# Patient Record
Sex: FEMALE | Race: WHITE | NOT HISPANIC OR LATINO | Employment: OTHER | URBAN - METROPOLITAN AREA
[De-identification: names, ages, dates, MRNs, and addresses within clinical notes are randomized per-mention and may not be internally consistent; named-entity substitution may affect disease eponyms.]

---

## 2017-01-03 ENCOUNTER — TELEPHONE (OUTPATIENT)
Dept: HEMATOLOGY/ONCOLOGY | Age: 67
End: 2017-01-03

## 2017-01-03 ENCOUNTER — LAB SERVICES (OUTPATIENT)
Dept: HEMATOLOGY/ONCOLOGY | Age: 67
End: 2017-01-03
Attending: INTERNAL MEDICINE

## 2017-01-03 DIAGNOSIS — C50.912 BILATERAL MALIGNANT NEOPLASM OF BREAST IN FEMALE, UNSPECIFIED SITE OF BREAST: ICD-10-CM

## 2017-01-03 DIAGNOSIS — C50.911 BILATERAL MALIGNANT NEOPLASM OF BREAST IN FEMALE, UNSPECIFIED SITE OF BREAST: ICD-10-CM

## 2017-01-03 LAB
ALBUMIN SERPL-MCNC: 3.5 G/DL (ref 3.6–5.1)
ALBUMIN/GLOB SERPL: 0.9 {RATIO} (ref 1–2.4)
ALP SERPL-CCNC: 104 UNITS/L (ref 45–117)
ALT SERPL-CCNC: 20 UNITS/L
ANION GAP SERPL CALC-SCNC: 9 MMOL/L (ref 10–20)
AST SERPL-CCNC: 15 UNITS/L
BASOPHILS # BLD AUTO: 0 K/MCL (ref 0–0.3)
BASOPHILS NFR BLD AUTO: 1 %
BILIRUB SERPL-MCNC: 0.3 MG/DL (ref 0.2–1)
BUN SERPL-MCNC: 19 MG/DL (ref 10–20)
BUN/CREAT SERPL: 23 (ref 7–25)
CALCIUM SERPL-MCNC: 9 MG/DL (ref 8.4–10.2)
CANCER AG27-29 SERPL-ACNC: 12.9 UNITS/ML (ref 0–40)
CHLORIDE SERPL-SCNC: 91 MMOL/L (ref 98–107)
CO2 SERPL-SCNC: 32 MMOL/L (ref 21–32)
CREAT SERPL-MCNC: 0.84 MG/DL (ref 0.51–0.95)
DIFFERENTIAL METHOD BLD: NORMAL
EOSINOPHIL # BLD AUTO: 0.3 K/MCL (ref 0.1–0.5)
EOSINOPHIL NFR SPEC: 5 %
ERYTHROCYTE [DISTWIDTH] IN BLOOD: 12.9 % (ref 11–15)
FASTING STATUS PATIENT QL REPORTED: ABNORMAL HRS
GLOBULIN SER-MCNC: 3.7 G/DL (ref 2–4)
GLUCOSE SERPL-MCNC: 94 MG/DL (ref 65–99)
HCT VFR BLD CALC: 38.3 % (ref 36–46.5)
HGB BLD-MCNC: 13.3 G/DL (ref 12–15.5)
LYMPHOCYTES # BLD MANUAL: 1.4 K/MCL (ref 1–4)
LYMPHOCYTES NFR BLD MANUAL: 27 %
MCH RBC QN AUTO: 30.6 PG (ref 26–34)
MCHC RBC AUTO-ENTMCNC: 34.7 G/DL (ref 32–36.5)
MCV RBC AUTO: 88.2 FL (ref 78–100)
MONOCYTES # BLD MANUAL: 0.5 K/MCL (ref 0.3–0.9)
MONOCYTES NFR BLD MANUAL: 10 %
NEUTROPHILS # BLD AUTO: 2.8 K/MCL (ref 1.8–7.7)
NEUTROPHILS NFR BLD AUTO: 57 %
PLATELET # BLD: 264 K/MCL (ref 140–450)
POTASSIUM SERPL-SCNC: 3.5 MMOL/L (ref 3.4–5.1)
PROT SERPL-MCNC: 7.2 G/DL (ref 6.4–8.2)
RBC # BLD: 4.34 MIL/MCL (ref 4–5.2)
SODIUM SERPL-SCNC: 128 MMOL/L (ref 135–145)
WBC # BLD: 5 K/MCL (ref 4.2–11)

## 2017-01-03 PROCEDURE — 85025 COMPLETE CBC W/AUTO DIFF WBC: CPT

## 2017-01-03 PROCEDURE — 80053 COMPREHEN METABOLIC PANEL: CPT

## 2017-01-03 PROCEDURE — 36415 COLL VENOUS BLD VENIPUNCTURE: CPT

## 2017-01-03 PROCEDURE — 86300 IMMUNOASSAY TUMOR CA 15-3: CPT

## 2017-01-03 RX ORDER — ANASTROZOLE 1 MG/1
1 TABLET ORAL DAILY
Status: SHIPPED | COMMUNITY
Start: 2017-01-03 | End: 2017-01-10 | Stop reason: ALTCHOICE

## 2017-01-09 ENCOUNTER — OFFICE VISIT (OUTPATIENT)
Dept: SURGERY | Age: 67
End: 2017-01-09

## 2017-01-09 VITALS
SYSTOLIC BLOOD PRESSURE: 120 MMHG | HEIGHT: 67 IN | WEIGHT: 153 LBS | BODY MASS INDEX: 24.01 KG/M2 | DIASTOLIC BLOOD PRESSURE: 68 MMHG

## 2017-01-09 DIAGNOSIS — C50.919 MALIGNANT NEOPLASM OF FEMALE BREAST, UNSPECIFIED LATERALITY, UNSPECIFIED SITE OF BREAST: Primary | ICD-10-CM

## 2017-01-09 PROCEDURE — 99213 OFFICE O/P EST LOW 20 MIN: CPT | Performed by: SURGERY

## 2017-01-10 ENCOUNTER — OFFICE VISIT (OUTPATIENT)
Dept: HEMATOLOGY/ONCOLOGY | Age: 67
End: 2017-01-10
Attending: INTERNAL MEDICINE

## 2017-01-10 DIAGNOSIS — C50.911 MALIGNANT NEOPLASM OF RIGHT FEMALE BREAST, UNSPECIFIED SITE OF BREAST: Primary | ICD-10-CM

## 2017-01-10 DIAGNOSIS — Z78.0 ASYMPTOMATIC MENOPAUSAL STATE: ICD-10-CM

## 2017-01-10 LAB — VIA MED ONC PATHWAYS TAG: NORMAL

## 2017-01-10 PROCEDURE — 99213 OFFICE O/P EST LOW 20 MIN: CPT | Performed by: INTERNAL MEDICINE

## 2017-01-10 RX ORDER — EXEMESTANE 25 MG/1
25 TABLET ORAL DAILY
Qty: 90 TABLET | Refills: 3 | Status: SHIPPED | OUTPATIENT
Start: 2017-01-10 | End: 2017-05-02 | Stop reason: ALTCHOICE

## 2017-01-10 ASSESSMENT — PAIN SCALES - GENERAL: PAINLEVEL: 0

## 2017-02-06 ENCOUNTER — OFFICE VISIT (OUTPATIENT)
Dept: SURGERY | Age: 67
End: 2017-02-06

## 2017-02-06 VITALS
SYSTOLIC BLOOD PRESSURE: 112 MMHG | DIASTOLIC BLOOD PRESSURE: 74 MMHG | WEIGHT: 154.8 LBS | HEIGHT: 67 IN | BODY MASS INDEX: 24.3 KG/M2 | HEART RATE: 68 BPM

## 2017-02-06 DIAGNOSIS — Z09 SURGICAL FOLLOW-UP CARE: Primary | ICD-10-CM

## 2017-02-06 PROCEDURE — 99024 POSTOP FOLLOW-UP VISIT: CPT | Performed by: SURGERY

## 2017-04-27 ENCOUNTER — TELEPHONE (OUTPATIENT)
Dept: HEMATOLOGY/ONCOLOGY | Age: 67
End: 2017-04-27

## 2017-04-27 DIAGNOSIS — C50.912 MALIGNANT NEOPLASM OF LEFT FEMALE BREAST, UNSPECIFIED SITE OF BREAST: ICD-10-CM

## 2017-04-27 DIAGNOSIS — C50.911 MALIGNANT NEOPLASM OF RIGHT FEMALE BREAST, UNSPECIFIED SITE OF BREAST: Primary | ICD-10-CM

## 2017-05-02 ENCOUNTER — TELEPHONE (OUTPATIENT)
Dept: HEMATOLOGY/ONCOLOGY | Age: 67
End: 2017-05-02

## 2017-05-02 DIAGNOSIS — C50.911 MALIGNANT NEOPLASM OF RIGHT FEMALE BREAST, UNSPECIFIED SITE OF BREAST: Primary | ICD-10-CM

## 2017-05-02 RX ORDER — TAMOXIFEN CITRATE 20 MG/1
20 TABLET ORAL DAILY
Qty: 30 TABLET | Refills: 0 | Status: SHIPPED | OUTPATIENT
Start: 2017-05-02 | End: 2017-11-06 | Stop reason: ALTCHOICE

## 2017-05-08 ENCOUNTER — TELEPHONE (OUTPATIENT)
Dept: HEMATOLOGY/ONCOLOGY | Age: 67
End: 2017-05-08

## 2017-05-09 ENCOUNTER — TELEPHONE (OUTPATIENT)
Dept: HEMATOLOGY/ONCOLOGY | Age: 67
End: 2017-05-09

## 2017-05-15 ENCOUNTER — TELEPHONE (OUTPATIENT)
Dept: HEMATOLOGY/ONCOLOGY | Age: 67
End: 2017-05-15

## 2017-05-25 ENCOUNTER — OFFICE VISIT (OUTPATIENT)
Dept: HEMATOLOGY/ONCOLOGY | Age: 67
End: 2017-05-25
Attending: INTERNAL MEDICINE

## 2017-05-25 DIAGNOSIS — C50.912 MALIGNANT NEOPLASM OF LEFT FEMALE BREAST, UNSPECIFIED SITE OF BREAST: Primary | ICD-10-CM

## 2017-05-25 LAB — VIA MED ONC PATHWAYS TAG: NORMAL

## 2017-05-25 PROCEDURE — 99211 OFF/OP EST MAY X REQ PHY/QHP: CPT

## 2017-05-25 PROCEDURE — 99214 OFFICE O/P EST MOD 30 MIN: CPT | Performed by: INTERNAL MEDICINE

## 2017-05-25 ASSESSMENT — PAIN SCALES - GENERAL: PAINLEVEL: 0

## 2017-07-01 DIAGNOSIS — C50.911 MALIGNANT NEOPLASM OF RIGHT FEMALE BREAST, UNSPECIFIED SITE OF BREAST: ICD-10-CM

## 2017-07-03 RX ORDER — TAMOXIFEN CITRATE 20 MG/1
20 TABLET ORAL DAILY
Qty: 30 TABLET | Refills: 0 | OUTPATIENT
Start: 2017-07-03

## 2017-07-18 ENCOUNTER — APPOINTMENT (OUTPATIENT)
Dept: HEMATOLOGY/ONCOLOGY | Age: 67
End: 2017-07-18
Attending: INTERNAL MEDICINE

## 2017-08-07 ENCOUNTER — OFFICE VISIT (OUTPATIENT)
Dept: SURGERY | Age: 67
End: 2017-08-07

## 2017-08-07 VITALS
SYSTOLIC BLOOD PRESSURE: 108 MMHG | DIASTOLIC BLOOD PRESSURE: 78 MMHG | BODY MASS INDEX: 24.78 KG/M2 | HEIGHT: 67 IN | WEIGHT: 157.85 LBS

## 2017-08-07 DIAGNOSIS — C50.919 MALIGNANT NEOPLASM OF FEMALE BREAST, UNSPECIFIED LATERALITY, UNSPECIFIED SITE OF BREAST: Primary | ICD-10-CM

## 2017-09-07 ENCOUNTER — TELEPHONE (OUTPATIENT)
Dept: HEMATOLOGY/ONCOLOGY | Age: 67
End: 2017-09-07

## 2017-09-07 DIAGNOSIS — C50.911 MALIGNANT NEOPLASM OF RIGHT FEMALE BREAST, UNSPECIFIED SITE OF BREAST: Primary | ICD-10-CM

## 2017-09-07 RX ORDER — ANASTROZOLE 1 MG/1
1 TABLET ORAL DAILY
Qty: 90 TABLET | Refills: 3 | Status: SHIPPED | OUTPATIENT
Start: 2017-09-07 | End: 2018-09-07 | Stop reason: SDUPTHER

## 2017-11-03 DIAGNOSIS — Z78.0 ASYMPTOMATIC POSTMENOPAUSAL STATE: Primary | ICD-10-CM

## 2017-11-03 DIAGNOSIS — Z17.0 MALIGNANT NEOPLASM OF RIGHT BREAST IN FEMALE, ESTROGEN RECEPTOR POSITIVE, UNSPECIFIED SITE OF BREAST (CMD): ICD-10-CM

## 2017-11-03 DIAGNOSIS — C50.912 MALIGNANT NEOPLASM OF LEFT BREAST IN FEMALE, ESTROGEN RECEPTOR POSITIVE, UNSPECIFIED SITE OF BREAST (CMD): ICD-10-CM

## 2017-11-03 DIAGNOSIS — Z17.0 MALIGNANT NEOPLASM OF LEFT BREAST IN FEMALE, ESTROGEN RECEPTOR POSITIVE, UNSPECIFIED SITE OF BREAST (CMD): ICD-10-CM

## 2017-11-03 DIAGNOSIS — C50.911 MALIGNANT NEOPLASM OF RIGHT BREAST IN FEMALE, ESTROGEN RECEPTOR POSITIVE, UNSPECIFIED SITE OF BREAST (CMD): ICD-10-CM

## 2017-11-06 ENCOUNTER — OFFICE VISIT (OUTPATIENT)
Dept: SURGERY | Age: 67
End: 2017-11-06

## 2017-11-06 VITALS
HEART RATE: 72 BPM | DIASTOLIC BLOOD PRESSURE: 60 MMHG | WEIGHT: 157 LBS | SYSTOLIC BLOOD PRESSURE: 110 MMHG | BODY MASS INDEX: 24.64 KG/M2 | HEIGHT: 67 IN

## 2017-11-06 DIAGNOSIS — L76.34 POSTOPERATIVE SEROMA OF SKIN AFTER NON-DERMATOLOGIC PROCEDURE: Primary | ICD-10-CM

## 2017-11-06 PROCEDURE — 99213 OFFICE O/P EST LOW 20 MIN: CPT | Performed by: SURGERY

## 2017-11-10 ENCOUNTER — TELEPHONE (OUTPATIENT)
Dept: HEMATOLOGY/ONCOLOGY | Age: 67
End: 2017-11-10

## 2017-11-10 DIAGNOSIS — C50.912 MALIGNANT NEOPLASM OF LEFT BREAST IN FEMALE, ESTROGEN RECEPTOR POSITIVE, UNSPECIFIED SITE OF BREAST (CMD): Primary | ICD-10-CM

## 2017-11-10 DIAGNOSIS — Z78.0 ASYMPTOMATIC MENOPAUSAL STATE: ICD-10-CM

## 2017-11-10 DIAGNOSIS — Z17.0 MALIGNANT NEOPLASM OF LEFT BREAST IN FEMALE, ESTROGEN RECEPTOR POSITIVE, UNSPECIFIED SITE OF BREAST (CMD): Primary | ICD-10-CM

## 2017-11-13 ENCOUNTER — APPOINTMENT (OUTPATIENT)
Dept: HEMATOLOGY/ONCOLOGY | Age: 67
End: 2017-11-13
Attending: INTERNAL MEDICINE

## 2017-11-13 ENCOUNTER — TELEPHONE (OUTPATIENT)
Dept: HEMATOLOGY/ONCOLOGY | Age: 67
End: 2017-11-13

## 2017-11-13 ENCOUNTER — HOSPITAL ENCOUNTER (OUTPATIENT)
Dept: MAMMOGRAPHY | Age: 67
End: 2017-11-13
Attending: INTERNAL MEDICINE

## 2017-11-14 ENCOUNTER — APPOINTMENT (OUTPATIENT)
Dept: HEMATOLOGY/ONCOLOGY | Age: 67
End: 2017-11-14
Attending: INTERNAL MEDICINE

## 2017-11-15 ENCOUNTER — APPOINTMENT (OUTPATIENT)
Dept: HEMATOLOGY/ONCOLOGY | Age: 67
End: 2017-11-15
Attending: INTERNAL MEDICINE

## 2017-11-15 LAB — VIA MED ONC PATHWAYS TAG: NORMAL

## 2017-11-17 ENCOUNTER — HOSPITAL ENCOUNTER (OUTPATIENT)
Dept: MAMMOGRAPHY | Age: 67
Discharge: HOME | End: 2017-11-17
Attending: INTERNAL MEDICINE

## 2017-11-17 ENCOUNTER — LAB SERVICES (OUTPATIENT)
Dept: HEMATOLOGY/ONCOLOGY | Age: 67
End: 2017-11-17
Attending: INTERNAL MEDICINE

## 2017-11-17 DIAGNOSIS — C50.912 MALIGNANT NEOPLASM OF LEFT BREAST IN FEMALE, ESTROGEN RECEPTOR POSITIVE, UNSPECIFIED SITE OF BREAST (CMD): ICD-10-CM

## 2017-11-17 DIAGNOSIS — C50.912 MALIGNANT NEOPLASM OF LEFT FEMALE BREAST (CMD): ICD-10-CM

## 2017-11-17 DIAGNOSIS — Z78.0 ASYMPTOMATIC MENOPAUSAL STATE: ICD-10-CM

## 2017-11-17 DIAGNOSIS — Z17.0 MALIGNANT NEOPLASM OF LEFT BREAST IN FEMALE, ESTROGEN RECEPTOR POSITIVE, UNSPECIFIED SITE OF BREAST (CMD): ICD-10-CM

## 2017-11-17 LAB
ALBUMIN SERPL-MCNC: 3.4 G/DL (ref 3.6–5.1)
ALBUMIN/GLOB SERPL: 0.9 {RATIO} (ref 1–2.4)
ALP SERPL-CCNC: 138 UNITS/L (ref 45–117)
ALT SERPL-CCNC: 14 UNITS/L
ANION GAP SERPL CALC-SCNC: 10 MMOL/L (ref 10–20)
AST SERPL-CCNC: 14 UNITS/L
BASOPHILS # BLD AUTO: 0 K/MCL (ref 0–0.3)
BASOPHILS NFR BLD AUTO: 0 %
BILIRUB SERPL-MCNC: 0.2 MG/DL (ref 0.2–1)
BUN SERPL-MCNC: 13 MG/DL (ref 6–20)
BUN/CREAT SERPL: 14 (ref 7–25)
CALCIUM SERPL-MCNC: 9 MG/DL (ref 8.4–10.2)
CHLORIDE SERPL-SCNC: 95 MMOL/L (ref 98–107)
CO2 SERPL-SCNC: 29 MMOL/L (ref 21–32)
CREAT SERPL-MCNC: 0.94 MG/DL (ref 0.51–0.95)
DIFFERENTIAL METHOD BLD: ABNORMAL
EOSINOPHIL # BLD AUTO: 0.1 K/MCL (ref 0.1–0.5)
EOSINOPHIL NFR SPEC: 3 %
ERYTHROCYTE [DISTWIDTH] IN BLOOD: 13.8 % (ref 11–15)
FASTING STATUS PATIENT QL REPORTED: ABNORMAL HRS
GLOBULIN SER-MCNC: 3.8 G/DL (ref 2–4)
GLUCOSE SERPL-MCNC: 92 MG/DL (ref 65–99)
HCT VFR BLD CALC: 36.9 % (ref 36–46.5)
HGB BLD-MCNC: 12.5 G/DL (ref 12–15.5)
LYMPHOCYTES # BLD MANUAL: 1 K/MCL (ref 1–4)
LYMPHOCYTES NFR BLD MANUAL: 26 %
MCH RBC QN AUTO: 30 PG (ref 26–34)
MCHC RBC AUTO-ENTMCNC: 33.9 G/DL (ref 32–36.5)
MCV RBC AUTO: 88.5 FL (ref 78–100)
MONOCYTES # BLD MANUAL: 0.5 K/MCL (ref 0.3–0.9)
MONOCYTES NFR BLD MANUAL: 12 %
NEUTROPHILS # BLD: 2.1 K/MCL (ref 1.8–7.7)
NEUTROPHILS NFR BLD AUTO: 59 %
PLATELET # BLD: 239 K/MCL (ref 140–450)
POTASSIUM SERPL-SCNC: 4.1 MMOL/L (ref 3.4–5.1)
PROT SERPL-MCNC: 7.2 G/DL (ref 6.4–8.2)
RBC # BLD: 4.17 MIL/MCL (ref 4–5.2)
SODIUM SERPL-SCNC: 130 MMOL/L (ref 135–145)
WBC # BLD: 3.7 K/MCL (ref 4.2–11)

## 2017-11-17 PROCEDURE — 77080 DXA BONE DENSITY AXIAL: CPT

## 2017-11-17 PROCEDURE — 36415 COLL VENOUS BLD VENIPUNCTURE: CPT

## 2017-11-17 PROCEDURE — 77080 DXA BONE DENSITY AXIAL: CPT | Performed by: RADIOLOGY

## 2017-11-17 PROCEDURE — 85025 COMPLETE CBC W/AUTO DIFF WBC: CPT

## 2017-11-17 PROCEDURE — 86300 IMMUNOASSAY TUMOR CA 15-3: CPT

## 2017-11-17 PROCEDURE — 80053 COMPREHEN METABOLIC PANEL: CPT

## 2017-11-18 LAB — CANCER AG27-29 SERPL-ACNC: 20.5 UNITS/ML (ref 0–40)

## 2017-11-21 ENCOUNTER — OFFICE VISIT (OUTPATIENT)
Dept: HEMATOLOGY/ONCOLOGY | Age: 67
End: 2017-11-21
Attending: INTERNAL MEDICINE

## 2017-11-21 DIAGNOSIS — Z17.0 MALIGNANT NEOPLASM OF NIPPLE OF LEFT BREAST IN FEMALE, ESTROGEN RECEPTOR POSITIVE (CMD): Primary | ICD-10-CM

## 2017-11-21 DIAGNOSIS — C50.012 MALIGNANT NEOPLASM OF NIPPLE OF LEFT BREAST IN FEMALE, ESTROGEN RECEPTOR POSITIVE (CMD): Primary | ICD-10-CM

## 2017-11-21 PROBLEM — M81.0 OSTEOPOROSIS: Status: ACTIVE | Noted: 2017-11-21

## 2017-11-21 LAB — VIA MED ONC PATHWAYS TAG: NORMAL

## 2017-11-21 PROCEDURE — 99213 OFFICE O/P EST LOW 20 MIN: CPT | Performed by: INTERNAL MEDICINE

## 2017-11-21 PROCEDURE — 99211 OFF/OP EST MAY X REQ PHY/QHP: CPT

## 2017-11-21 RX ORDER — DIPHENHYDRAMINE HYDROCHLORIDE 50 MG/ML
25 INJECTION INTRAMUSCULAR; INTRAVENOUS
Status: CANCELLED
Start: 2017-12-11

## 2017-11-21 RX ORDER — FAMOTIDINE 10 MG/ML
20 INJECTION, SOLUTION INTRAVENOUS
Status: CANCELLED | OUTPATIENT
Start: 2017-12-11

## 2017-11-21 RX ORDER — MULTIVITAMIN WITH IRON
TABLET ORAL
COMMUNITY

## 2017-11-21 RX ORDER — ALBUTEROL SULFATE 90 UG/1
2 AEROSOL, METERED RESPIRATORY (INHALATION)
Status: CANCELLED | OUTPATIENT
Start: 2017-12-11

## 2017-11-21 ASSESSMENT — PAIN SCALES - GENERAL: PAINLEVEL: 0

## 2017-11-22 ENCOUNTER — TELEPHONE (OUTPATIENT)
Dept: HEMATOLOGY/ONCOLOGY | Age: 67
End: 2017-11-22

## 2017-11-28 LAB — HCV AB SER-ACNC: <0.1

## 2017-12-11 ENCOUNTER — OFFICE VISIT (OUTPATIENT)
Dept: SURGERY | Age: 67
End: 2017-12-11

## 2017-12-11 ENCOUNTER — LAB SERVICES (OUTPATIENT)
Dept: HEMATOLOGY/ONCOLOGY | Age: 67
End: 2017-12-11
Attending: INTERNAL MEDICINE

## 2017-12-11 ENCOUNTER — NURSE ONLY (OUTPATIENT)
Dept: HEMATOLOGY/ONCOLOGY | Age: 67
End: 2017-12-11
Attending: INTERNAL MEDICINE

## 2017-12-11 ENCOUNTER — TELEPHONE (OUTPATIENT)
Dept: HEMATOLOGY/ONCOLOGY | Age: 67
End: 2017-12-11

## 2017-12-11 VITALS
SYSTOLIC BLOOD PRESSURE: 130 MMHG | DIASTOLIC BLOOD PRESSURE: 82 MMHG | BODY MASS INDEX: 25.16 KG/M2 | HEIGHT: 67 IN | WEIGHT: 160.27 LBS | HEART RATE: 72 BPM

## 2017-12-11 VITALS
TEMPERATURE: 97.6 F | DIASTOLIC BLOOD PRESSURE: 63 MMHG | HEART RATE: 73 BPM | RESPIRATION RATE: 16 BRPM | SYSTOLIC BLOOD PRESSURE: 118 MMHG | OXYGEN SATURATION: 99 %

## 2017-12-11 DIAGNOSIS — M80.00XA AGE-RELATED OSTEOPOROSIS WITH CURRENT PATHOLOGICAL FRACTURE, INITIAL ENCOUNTER: Primary | ICD-10-CM

## 2017-12-11 DIAGNOSIS — C50.012 MALIGNANT NEOPLASM OF NIPPLE OF LEFT BREAST IN FEMALE, ESTROGEN RECEPTOR POSITIVE (CMD): ICD-10-CM

## 2017-12-11 DIAGNOSIS — Z09 SURGICAL FOLLOW-UP CARE: Primary | ICD-10-CM

## 2017-12-11 DIAGNOSIS — Z17.0 MALIGNANT NEOPLASM OF NIPPLE OF LEFT BREAST IN FEMALE, ESTROGEN RECEPTOR POSITIVE (CMD): ICD-10-CM

## 2017-12-11 LAB
ALBUMIN SERPL-MCNC: 3.3 G/DL (ref 3.6–5.1)
ALBUMIN/GLOB SERPL: 0.8 {RATIO} (ref 1–2.4)
ALP SERPL-CCNC: 135 UNITS/L (ref 45–117)
ALT SERPL-CCNC: 20 UNITS/L
ANION GAP SERPL CALC-SCNC: 13 MMOL/L (ref 10–20)
AST SERPL-CCNC: 14 UNITS/L
BILIRUB SERPL-MCNC: 0.2 MG/DL (ref 0.2–1)
BUN SERPL-MCNC: 13 MG/DL (ref 6–20)
BUN/CREAT SERPL: 15 (ref 7–25)
CALCIUM SERPL-MCNC: 9.1 MG/DL (ref 8.4–10.2)
CHLORIDE SERPL-SCNC: 97 MMOL/L (ref 98–107)
CO2 SERPL-SCNC: 28 MMOL/L (ref 21–32)
CREAT SERPL-MCNC: 0.88 MG/DL (ref 0.51–0.95)
FASTING STATUS PATIENT QL REPORTED: ABNORMAL HRS
GLOBULIN SER-MCNC: 4.3 G/DL (ref 2–4)
GLUCOSE SERPL-MCNC: 130 MG/DL (ref 65–99)
POTASSIUM SERPL-SCNC: 3.1 MMOL/L (ref 3.4–5.1)
PROT SERPL-MCNC: 7.6 G/DL (ref 6.4–8.2)
SODIUM SERPL-SCNC: 135 MMOL/L (ref 135–145)

## 2017-12-11 PROCEDURE — 80053 COMPREHEN METABOLIC PANEL: CPT

## 2017-12-11 PROCEDURE — 36415 COLL VENOUS BLD VENIPUNCTURE: CPT

## 2017-12-11 PROCEDURE — 96372 THER/PROPH/DIAG INJ SC/IM: CPT | Performed by: INTERNAL MEDICINE

## 2017-12-11 PROCEDURE — 99024 POSTOP FOLLOW-UP VISIT: CPT | Performed by: SURGERY

## 2017-12-11 PROCEDURE — 10002800 HB RX 250 W HCPCS: Performed by: INTERNAL MEDICINE

## 2017-12-11 RX ADMIN — DENOSUMAB 60 MG: 60 INJECTION SUBCUTANEOUS at 12:01

## 2018-02-14 ENCOUNTER — TELEPHONE (OUTPATIENT)
Dept: HEMATOLOGY/ONCOLOGY | Age: 68
End: 2018-02-14

## 2018-03-12 ENCOUNTER — OFFICE VISIT (OUTPATIENT)
Dept: SURGERY | Age: 68
End: 2018-03-12

## 2018-03-12 VITALS
WEIGHT: 155 LBS | DIASTOLIC BLOOD PRESSURE: 72 MMHG | HEIGHT: 67 IN | SYSTOLIC BLOOD PRESSURE: 118 MMHG | BODY MASS INDEX: 24.33 KG/M2 | HEART RATE: 68 BPM

## 2018-03-12 DIAGNOSIS — Z09 SURGICAL FOLLOW-UP CARE: Primary | ICD-10-CM

## 2018-03-12 PROCEDURE — 99024 POSTOP FOLLOW-UP VISIT: CPT | Performed by: SURGERY

## 2018-04-23 ENCOUNTER — OFFICE VISIT (OUTPATIENT)
Dept: SURGERY | Age: 68
End: 2018-04-23

## 2018-04-23 VITALS — DIASTOLIC BLOOD PRESSURE: 74 MMHG | SYSTOLIC BLOOD PRESSURE: 116 MMHG | HEART RATE: 64 BPM

## 2018-04-23 DIAGNOSIS — C50.911 MALIGNANT NEOPLASM OF RIGHT FEMALE BREAST, UNSPECIFIED ESTROGEN RECEPTOR STATUS, UNSPECIFIED SITE OF BREAST (CMD): Primary | ICD-10-CM

## 2018-04-23 PROCEDURE — 99213 OFFICE O/P EST LOW 20 MIN: CPT | Performed by: SURGERY

## 2018-04-30 ENCOUNTER — HOSPITAL ENCOUNTER (OUTPATIENT)
Dept: CT IMAGING | Age: 68
Discharge: HOME | End: 2018-04-30
Attending: SURGERY

## 2018-04-30 ENCOUNTER — OFFICE VISIT (OUTPATIENT)
Dept: SURGERY | Age: 68
End: 2018-04-30

## 2018-04-30 VITALS — DIASTOLIC BLOOD PRESSURE: 70 MMHG | HEIGHT: 67 IN | SYSTOLIC BLOOD PRESSURE: 110 MMHG | HEART RATE: 72 BPM

## 2018-04-30 DIAGNOSIS — C50.911 MALIGNANT NEOPLASM OF RIGHT FEMALE BREAST, UNSPECIFIED ESTROGEN RECEPTOR STATUS, UNSPECIFIED SITE OF BREAST (CMD): ICD-10-CM

## 2018-04-30 DIAGNOSIS — Z85.3 PERSONAL HISTORY OF BREAST CANCER: Primary | ICD-10-CM

## 2018-04-30 PROCEDURE — 99024 POSTOP FOLLOW-UP VISIT: CPT | Performed by: SURGERY

## 2018-04-30 PROCEDURE — 76642 ULTRASOUND BREAST LIMITED: CPT | Performed by: RADIOLOGY

## 2018-04-30 PROCEDURE — 76642 ULTRASOUND BREAST LIMITED: CPT

## 2018-05-15 ENCOUNTER — LAB SERVICES (OUTPATIENT)
Dept: HEMATOLOGY/ONCOLOGY | Age: 68
End: 2018-05-15
Attending: INTERNAL MEDICINE

## 2018-05-15 DIAGNOSIS — C50.012 MALIGNANT NEOPLASM OF NIPPLE OF LEFT BREAST IN FEMALE, ESTROGEN RECEPTOR POSITIVE (CMD): ICD-10-CM

## 2018-05-15 DIAGNOSIS — Z17.0 MALIGNANT NEOPLASM OF NIPPLE OF LEFT BREAST IN FEMALE, ESTROGEN RECEPTOR POSITIVE (CMD): ICD-10-CM

## 2018-05-15 LAB
BASOPHILS # BLD AUTO: 0 K/MCL (ref 0–0.3)
BASOPHILS NFR BLD AUTO: 0 %
DIFFERENTIAL METHOD BLD: NORMAL
EOSINOPHIL # BLD AUTO: 0.2 K/MCL (ref 0.1–0.5)
EOSINOPHIL NFR SPEC: 4 %
ERYTHROCYTE [DISTWIDTH] IN BLOOD: 13.3 % (ref 11–15)
HCT VFR BLD CALC: 39.5 % (ref 36–46.5)
HGB BLD-MCNC: 13.5 G/DL (ref 12–15.5)
LYMPHOCYTES # BLD MANUAL: 1.3 K/MCL (ref 1–4)
LYMPHOCYTES NFR BLD MANUAL: 26 %
MCH RBC QN AUTO: 30.6 PG (ref 26–34)
MCHC RBC AUTO-ENTMCNC: 34.2 G/DL (ref 32–36.5)
MCV RBC AUTO: 89.6 FL (ref 78–100)
MONOCYTES # BLD MANUAL: 0.6 K/MCL (ref 0.3–0.9)
MONOCYTES NFR BLD MANUAL: 11 %
NEUTROPHILS # BLD: 3 K/MCL (ref 1.8–7.7)
NEUTROPHILS NFR BLD AUTO: 59 %
PLATELET # BLD: 236 K/MCL (ref 140–450)
RBC # BLD: 4.41 MIL/MCL (ref 4–5.2)
WBC # BLD: 5.1 K/MCL (ref 4.2–11)

## 2018-05-15 PROCEDURE — 36415 COLL VENOUS BLD VENIPUNCTURE: CPT

## 2018-05-15 PROCEDURE — 85025 COMPLETE CBC W/AUTO DIFF WBC: CPT

## 2018-05-15 PROCEDURE — 86300 IMMUNOASSAY TUMOR CA 15-3: CPT

## 2018-05-16 LAB — CANCER AG27-29 SERPL-ACNC: 16.8 UNITS/ML (ref 0–40)

## 2018-05-22 ENCOUNTER — OFFICE VISIT (OUTPATIENT)
Dept: HEMATOLOGY/ONCOLOGY | Age: 68
End: 2018-05-22
Attending: INTERNAL MEDICINE

## 2018-05-22 DIAGNOSIS — M80.00XA AGE-RELATED OSTEOPOROSIS WITH CURRENT PATHOLOGICAL FRACTURE, INITIAL ENCOUNTER: ICD-10-CM

## 2018-05-22 DIAGNOSIS — C50.911 MALIGNANT NEOPLASM OF RIGHT FEMALE BREAST, UNSPECIFIED ESTROGEN RECEPTOR STATUS, UNSPECIFIED SITE OF BREAST (CMD): Primary | ICD-10-CM

## 2018-05-22 LAB — VIA MED ONC PATHWAYS TAG: NORMAL

## 2018-05-22 PROCEDURE — 99213 OFFICE O/P EST LOW 20 MIN: CPT | Performed by: INTERNAL MEDICINE

## 2018-05-22 ASSESSMENT — PAIN SCALES - GENERAL: PAINLEVEL: 0

## 2018-06-11 ENCOUNTER — APPOINTMENT (OUTPATIENT)
Dept: HEMATOLOGY/ONCOLOGY | Age: 68
End: 2018-06-11
Attending: INTERNAL MEDICINE

## 2018-06-11 ENCOUNTER — APPOINTMENT (OUTPATIENT)
Dept: SURGERY | Age: 68
End: 2018-06-11

## 2018-06-12 ENCOUNTER — OFFICE VISIT (OUTPATIENT)
Dept: SURGERY | Age: 68
End: 2018-06-12

## 2018-06-12 VITALS — DIASTOLIC BLOOD PRESSURE: 72 MMHG | SYSTOLIC BLOOD PRESSURE: 118 MMHG

## 2018-06-12 DIAGNOSIS — C50.911 MALIGNANT NEOPLASM OF RIGHT FEMALE BREAST, UNSPECIFIED ESTROGEN RECEPTOR STATUS, UNSPECIFIED SITE OF BREAST (CMD): Primary | ICD-10-CM

## 2018-06-12 PROCEDURE — 99024 POSTOP FOLLOW-UP VISIT: CPT | Performed by: SURGERY

## 2018-09-07 DIAGNOSIS — C50.911 MALIGNANT NEOPLASM OF RIGHT FEMALE BREAST (CMD): ICD-10-CM

## 2018-09-07 RX ORDER — ANASTROZOLE 1 MG/1
TABLET ORAL
Qty: 90 TABLET | Refills: 3 | Status: SHIPPED | OUTPATIENT
Start: 2018-09-07 | End: 2018-12-03 | Stop reason: SDUPTHER

## 2018-11-30 ENCOUNTER — LAB SERVICES (OUTPATIENT)
Dept: HEMATOLOGY/ONCOLOGY | Age: 68
End: 2018-11-30
Attending: INTERNAL MEDICINE

## 2018-11-30 DIAGNOSIS — C50.911 MALIGNANT NEOPLASM OF RIGHT FEMALE BREAST, UNSPECIFIED ESTROGEN RECEPTOR STATUS, UNSPECIFIED SITE OF BREAST (CMD): ICD-10-CM

## 2018-11-30 DIAGNOSIS — M80.00XA AGE-RELATED OSTEOPOROSIS WITH CURRENT PATHOLOGICAL FRACTURE, INITIAL ENCOUNTER: ICD-10-CM

## 2018-11-30 LAB
ALBUMIN SERPL-MCNC: 3.4 G/DL (ref 3.6–5.1)
ALBUMIN/GLOB SERPL: 0.9 {RATIO} (ref 1–2.4)
ALP SERPL-CCNC: 131 UNITS/L (ref 45–117)
ALT SERPL-CCNC: 19 UNITS/L
ANION GAP SERPL CALC-SCNC: 11 MMOL/L (ref 10–20)
AST SERPL-CCNC: 15 UNITS/L
BASOPHILS # BLD AUTO: 0 K/MCL (ref 0–0.3)
BASOPHILS NFR BLD AUTO: 0 %
BILIRUB SERPL-MCNC: 0.3 MG/DL (ref 0.2–1)
BUN SERPL-MCNC: 22 MG/DL (ref 6–20)
BUN/CREAT SERPL: 28 (ref 7–25)
CALCIUM SERPL-MCNC: 9.4 MG/DL (ref 8.4–10.2)
CHLORIDE SERPL-SCNC: 95 MMOL/L (ref 98–107)
CO2 SERPL-SCNC: 32 MMOL/L (ref 21–32)
CREAT SERPL-MCNC: 0.8 MG/DL (ref 0.51–0.95)
DIFFERENTIAL METHOD BLD: ABNORMAL
EOSINOPHIL # BLD AUTO: 0.2 K/MCL (ref 0.1–0.5)
EOSINOPHIL NFR SPEC: 1 %
ERYTHROCYTE [DISTWIDTH] IN BLOOD: 13.5 % (ref 11–15)
FASTING STATUS PATIENT QL REPORTED: ABNORMAL HRS
GLOBULIN SER-MCNC: 3.8 G/DL (ref 2–4)
GLUCOSE SERPL-MCNC: 82 MG/DL (ref 65–99)
HCT VFR BLD CALC: 38 % (ref 36–46.5)
HGB BLD-MCNC: 12.8 G/DL (ref 12–15.5)
LYMPHOCYTES # BLD MANUAL: 2.4 K/MCL (ref 1–4)
LYMPHOCYTES NFR BLD MANUAL: 20 %
MCH RBC QN AUTO: 30 PG (ref 26–34)
MCHC RBC AUTO-ENTMCNC: 33.7 G/DL (ref 32–36.5)
MCV RBC AUTO: 89.2 FL (ref 78–100)
MONOCYTES # BLD MANUAL: 1.1 K/MCL (ref 0.3–0.9)
MONOCYTES NFR BLD MANUAL: 9 %
NEUTROPHILS # BLD: 8.2 K/MCL (ref 1.8–7.7)
NEUTROPHILS NFR BLD AUTO: 70 %
PLATELET # BLD: 285 K/MCL (ref 140–450)
POTASSIUM SERPL-SCNC: 3.5 MMOL/L (ref 3.4–5.1)
PROT SERPL-MCNC: 7.2 G/DL (ref 6.4–8.2)
RBC # BLD: 4.26 MIL/MCL (ref 4–5.2)
SODIUM SERPL-SCNC: 134 MMOL/L (ref 135–145)
WBC # BLD: 11.8 K/MCL (ref 4.2–11)

## 2018-11-30 PROCEDURE — 86300 IMMUNOASSAY TUMOR CA 15-3: CPT

## 2018-11-30 PROCEDURE — 80053 COMPREHEN METABOLIC PANEL: CPT

## 2018-11-30 PROCEDURE — 85025 COMPLETE CBC W/AUTO DIFF WBC: CPT

## 2018-11-30 PROCEDURE — 36415 COLL VENOUS BLD VENIPUNCTURE: CPT

## 2018-12-01 LAB — CANCER AG27-29 SERPL-ACNC: 18.6 UNITS/ML (ref 0–40)

## 2018-12-03 ENCOUNTER — TELEPHONE (OUTPATIENT)
Dept: HEMATOLOGY/ONCOLOGY | Age: 68
End: 2018-12-03

## 2018-12-03 DIAGNOSIS — C50.911 MALIGNANT NEOPLASM OF RIGHT FEMALE BREAST (CMD): ICD-10-CM

## 2018-12-03 RX ORDER — ANASTROZOLE 1 MG/1
1 TABLET ORAL DAILY
Qty: 90 TABLET | Refills: 0 | Status: SHIPPED | OUTPATIENT
Start: 2018-12-03 | End: 2018-12-06 | Stop reason: SDUPTHER

## 2018-12-06 ENCOUNTER — OFFICE VISIT (OUTPATIENT)
Dept: HEMATOLOGY/ONCOLOGY | Age: 68
End: 2018-12-06
Attending: INTERNAL MEDICINE

## 2018-12-06 DIAGNOSIS — C50.012 MALIGNANT NEOPLASM OF NIPPLE OF LEFT BREAST IN FEMALE, ESTROGEN RECEPTOR POSITIVE (CMD): Primary | ICD-10-CM

## 2018-12-06 DIAGNOSIS — C50.911 MALIGNANT NEOPLASM OF RIGHT FEMALE BREAST (CMD): ICD-10-CM

## 2018-12-06 DIAGNOSIS — Z17.0 MALIGNANT NEOPLASM OF NIPPLE OF LEFT BREAST IN FEMALE, ESTROGEN RECEPTOR POSITIVE (CMD): Primary | ICD-10-CM

## 2018-12-06 PROCEDURE — 99211 OFF/OP EST MAY X REQ PHY/QHP: CPT

## 2018-12-06 PROCEDURE — 99213 OFFICE O/P EST LOW 20 MIN: CPT | Performed by: INTERNAL MEDICINE

## 2018-12-06 RX ORDER — ANASTROZOLE 1 MG/1
1 TABLET ORAL DAILY
Qty: 90 TABLET | Refills: 0 | Status: SHIPPED | OUTPATIENT
Start: 2018-12-06 | End: 2019-03-07 | Stop reason: SDUPTHER

## 2018-12-06 ASSESSMENT — PAIN SCALES - GENERAL: PAINLEVEL: 0

## 2018-12-07 ENCOUNTER — TELEPHONE (OUTPATIENT)
Dept: HEMATOLOGY/ONCOLOGY | Age: 68
End: 2018-12-07

## 2018-12-07 LAB — VIA MED ONC PATHWAYS TAG: NORMAL

## 2019-03-07 DIAGNOSIS — C50.911 MALIGNANT NEOPLASM OF RIGHT FEMALE BREAST (CMD): ICD-10-CM

## 2019-03-08 RX ORDER — ANASTROZOLE 1 MG/1
TABLET ORAL
Qty: 90 TABLET | Refills: 0 | Status: SHIPPED | OUTPATIENT
Start: 2019-03-08 | End: 2019-06-03 | Stop reason: SDUPTHER

## 2019-06-03 DIAGNOSIS — C50.911 MALIGNANT NEOPLASM OF RIGHT FEMALE BREAST (CMD): ICD-10-CM

## 2019-06-03 RX ORDER — ANASTROZOLE 1 MG/1
TABLET ORAL
Qty: 90 TABLET | Refills: 0 | Status: SHIPPED | OUTPATIENT
Start: 2019-06-03 | End: 2019-09-17 | Stop reason: SDUPTHER

## 2019-09-17 DIAGNOSIS — C50.911 MALIGNANT NEOPLASM OF RIGHT FEMALE BREAST (CMD): ICD-10-CM

## 2019-09-17 RX ORDER — ANASTROZOLE 1 MG/1
TABLET ORAL
Qty: 90 TABLET | Refills: 0 | Status: SHIPPED | OUTPATIENT
Start: 2019-09-17 | End: 2020-01-08 | Stop reason: SDUPTHER

## 2019-11-07 ENCOUNTER — TELEPHONE (OUTPATIENT)
Dept: HEMATOLOGY/ONCOLOGY | Age: 69
End: 2019-11-07

## 2019-11-08 ENCOUNTER — TELEPHONE (OUTPATIENT)
Dept: HEMATOLOGY/ONCOLOGY | Age: 69
End: 2019-11-08

## 2019-12-11 ENCOUNTER — TELEPHONE (OUTPATIENT)
Dept: HEMATOLOGY/ONCOLOGY | Age: 69
End: 2019-12-11

## 2019-12-12 ENCOUNTER — APPOINTMENT (OUTPATIENT)
Dept: HEMATOLOGY/ONCOLOGY | Age: 69
End: 2019-12-12
Attending: INTERNAL MEDICINE

## 2020-01-07 ENCOUNTER — APPOINTMENT (OUTPATIENT)
Dept: HEMATOLOGY/ONCOLOGY | Age: 70
End: 2020-01-07
Attending: INTERNAL MEDICINE

## 2020-01-08 DIAGNOSIS — C50.911 MALIGNANT NEOPLASM OF RIGHT FEMALE BREAST (CMD): ICD-10-CM

## 2020-01-08 RX ORDER — ANASTROZOLE 1 MG/1
TABLET ORAL
Qty: 90 TABLET | Refills: 0 | Status: SHIPPED | OUTPATIENT
Start: 2020-01-08

## 2020-01-14 ENCOUNTER — TELEPHONE (OUTPATIENT)
Dept: HEMATOLOGY/ONCOLOGY | Age: 70
End: 2020-01-14

## 2020-01-24 ENCOUNTER — TELEPHONE (OUTPATIENT)
Dept: HEMATOLOGY/ONCOLOGY | Age: 70
End: 2020-01-24

## 2020-01-28 ENCOUNTER — APPOINTMENT (RX ONLY)
Dept: URBAN - METROPOLITAN AREA CLINIC 26 | Facility: CLINIC | Age: 70
Setting detail: DERMATOLOGY
End: 2020-01-28

## 2020-01-28 DIAGNOSIS — D22 MELANOCYTIC NEVI: ICD-10-CM

## 2020-01-28 PROBLEM — D22.5 MELANOCYTIC NEVI OF TRUNK: Status: ACTIVE | Noted: 2020-01-28

## 2020-01-28 PROCEDURE — ? COUNSELING

## 2020-01-28 PROCEDURE — 99202 OFFICE O/P NEW SF 15 MIN: CPT

## 2020-01-28 ASSESSMENT — LOCATION DETAILED DESCRIPTION DERM: LOCATION DETAILED: RIGHT SUPERIOR MEDIAL LOWER BACK

## 2020-01-28 ASSESSMENT — LOCATION SIMPLE DESCRIPTION DERM: LOCATION SIMPLE: RIGHT LOWER BACK

## 2020-01-28 ASSESSMENT — LOCATION ZONE DERM: LOCATION ZONE: TRUNK

## 2021-05-21 VITALS
RESPIRATION RATE: 16 BRPM | WEIGHT: 153.8 LBS | SYSTOLIC BLOOD PRESSURE: 128 MMHG | SYSTOLIC BLOOD PRESSURE: 128 MMHG | TEMPERATURE: 98.6 F | DIASTOLIC BLOOD PRESSURE: 67 MMHG | OXYGEN SATURATION: 100 % | DIASTOLIC BLOOD PRESSURE: 82 MMHG | BODY MASS INDEX: 24.71 KG/M2 | OXYGEN SATURATION: 97 % | SYSTOLIC BLOOD PRESSURE: 110 MMHG | OXYGEN SATURATION: 96 % | TEMPERATURE: 97.8 F | BODY MASS INDEX: 24.09 KG/M2 | RESPIRATION RATE: 16 BRPM | SYSTOLIC BLOOD PRESSURE: 118 MMHG | SYSTOLIC BLOOD PRESSURE: 132 MMHG | DIASTOLIC BLOOD PRESSURE: 74 MMHG | HEART RATE: 76 BPM | DIASTOLIC BLOOD PRESSURE: 62 MMHG | WEIGHT: 160 LBS | WEIGHT: 157.8 LBS | OXYGEN SATURATION: 97 % | BODY MASS INDEX: 25.06 KG/M2 | BODY MASS INDEX: 25.09 KG/M2 | OXYGEN SATURATION: 96 % | HEART RATE: 68 BPM | TEMPERATURE: 98 F | HEART RATE: 67 BPM | DIASTOLIC BLOOD PRESSURE: 62 MMHG | WEIGHT: 160.2 LBS | TEMPERATURE: 98 F | HEART RATE: 71 BPM | HEART RATE: 71 BPM | TEMPERATURE: 98.2 F | RESPIRATION RATE: 16 BRPM

## 2022-05-05 ENCOUNTER — TELEPHONE (OUTPATIENT)
Dept: HEMATOLOGY/ONCOLOGY | Age: 72
End: 2022-05-05

## 2022-05-06 ENCOUNTER — TELEPHONE (OUTPATIENT)
Dept: HEMATOLOGY/ONCOLOGY | Age: 72
End: 2022-05-06

## 2022-05-10 ENCOUNTER — OFFICE VISIT (OUTPATIENT)
Dept: HEMATOLOGY/ONCOLOGY | Age: 72
End: 2022-05-10
Attending: INTERNAL MEDICINE

## 2022-05-10 VITALS
HEART RATE: 75 BPM | OXYGEN SATURATION: 94 % | WEIGHT: 175 LBS | TEMPERATURE: 97.7 F | RESPIRATION RATE: 16 BRPM | SYSTOLIC BLOOD PRESSURE: 135 MMHG | BODY MASS INDEX: 27.41 KG/M2 | DIASTOLIC BLOOD PRESSURE: 67 MMHG

## 2022-05-10 DIAGNOSIS — C50.212 MALIGNANT NEOPLASM OF UPPER-INNER QUADRANT OF LEFT BREAST IN FEMALE, ESTROGEN RECEPTOR POSITIVE (CMD): Primary | ICD-10-CM

## 2022-05-10 DIAGNOSIS — Z17.0 MALIGNANT NEOPLASM OF UPPER-INNER QUADRANT OF LEFT BREAST IN FEMALE, ESTROGEN RECEPTOR POSITIVE (CMD): Primary | ICD-10-CM

## 2022-05-10 LAB — VIA MED ONC PATHWAYS TAG: NORMAL

## 2022-05-10 PROCEDURE — 99214 OFFICE O/P EST MOD 30 MIN: CPT | Performed by: INTERNAL MEDICINE

## 2022-05-10 PROCEDURE — 99211 OFF/OP EST MAY X REQ PHY/QHP: CPT

## 2022-05-10 ASSESSMENT — PAIN SCALES - GENERAL: PAINLEVEL: 4

## 2022-05-13 ENCOUNTER — APPOINTMENT (OUTPATIENT)
Dept: ULTRASOUND IMAGING | Age: 72
End: 2022-05-13
Attending: INTERNAL MEDICINE

## 2022-06-03 ENCOUNTER — TELEPHONE (OUTPATIENT)
Dept: HEMATOLOGY/ONCOLOGY | Age: 72
End: 2022-06-03

## 2022-06-03 DIAGNOSIS — Z17.0 MALIGNANT NEOPLASM OF UPPER-INNER QUADRANT OF LEFT BREAST IN FEMALE, ESTROGEN RECEPTOR POSITIVE (CMD): Primary | ICD-10-CM

## 2022-06-03 DIAGNOSIS — C50.212 MALIGNANT NEOPLASM OF UPPER-INNER QUADRANT OF LEFT BREAST IN FEMALE, ESTROGEN RECEPTOR POSITIVE (CMD): Primary | ICD-10-CM

## 2022-06-03 DIAGNOSIS — R22.31 AXILLARY MASS, RIGHT: ICD-10-CM

## 2022-06-06 ENCOUNTER — HOSPITAL ENCOUNTER (OUTPATIENT)
Dept: ULTRASOUND IMAGING | Age: 72
Discharge: HOME | End: 2022-06-06
Attending: INTERNAL MEDICINE

## 2022-06-06 DIAGNOSIS — R22.31 AXILLARY MASS, RIGHT: ICD-10-CM

## 2022-06-06 PROCEDURE — 76882 US LMTD JT/FCL EVL NVASC XTR: CPT

## 2022-06-06 PROCEDURE — 76882 US LMTD JT/FCL EVL NVASC XTR: CPT | Performed by: RADIOLOGY

## 2022-06-07 ENCOUNTER — TELEPHONE (OUTPATIENT)
Dept: HEMATOLOGY/ONCOLOGY | Age: 72
End: 2022-06-07

## 2024-03-28 ENCOUNTER — TELEPHONE (OUTPATIENT)
Dept: SCHEDULING | Facility: CLINIC | Age: 74
End: 2024-03-28
Payer: COMMERCIAL

## 2024-03-28 NOTE — TELEPHONE ENCOUNTER
New Patient Appointment FYI    Name of caller: Alva    Reason for Visit: has device/monitoring    Insurance: [employer]     Insurance ID #: in epic    Recent Cardiac Test/Procedures: MRI in Jan    Referred by: self    Primary Care Physician: Marlen Fernandes DO      Previous Cardiologist name and phone number: states she saw EP at [place]/states  placed pacemaker    Best contact number: [phone]    Pt scheduled for 4/23

## 2024-04-23 ENCOUNTER — TELEPHONE (OUTPATIENT)
Dept: CARDIOLOGY | Facility: CLINIC | Age: 74
End: 2024-04-23
Payer: COMMERCIAL

## 2024-04-23 ENCOUNTER — OFFICE VISIT (OUTPATIENT)
Dept: CARDIOLOGY | Facility: CLINIC | Age: 74
End: 2024-04-23
Payer: COMMERCIAL

## 2024-04-23 VITALS — SYSTOLIC BLOOD PRESSURE: 130 MMHG | DIASTOLIC BLOOD PRESSURE: 70 MMHG

## 2024-04-23 DIAGNOSIS — I10 PRIMARY HYPERTENSION: Primary | ICD-10-CM

## 2024-04-23 PROBLEM — I49.5 SINUS NODE DYSFUNCTION (CMS/HCC): Status: ACTIVE | Noted: 2024-04-23

## 2024-04-23 PROBLEM — E11.9 TYPE 2 DIABETES MELLITUS WITHOUT COMPLICATION (CMS/HCC): Status: ACTIVE | Noted: 2024-04-23

## 2024-04-23 PROBLEM — Z95.0 PACEMAKER: Status: ACTIVE | Noted: 2024-04-23

## 2024-04-23 LAB
ATRIAL RATE: 64
P AXIS: -14
PR INTERVAL: 170
QRS DURATION: 92
QT INTERVAL: 382
QTC CALCULATION(BAZETT): 394
R AXIS: -24
T WAVE AXIS: 11
VENTRICULAR RATE: 64

## 2024-04-23 PROCEDURE — 99205 OFFICE O/P NEW HI 60 MIN: CPT | Performed by: INTERNAL MEDICINE

## 2024-04-23 PROCEDURE — 3078F DIAST BP <80 MM HG: CPT | Performed by: INTERNAL MEDICINE

## 2024-04-23 PROCEDURE — 3075F SYST BP GE 130 - 139MM HG: CPT | Performed by: INTERNAL MEDICINE

## 2024-04-23 PROCEDURE — 93000 ELECTROCARDIOGRAM COMPLETE: CPT | Performed by: INTERNAL MEDICINE

## 2024-04-23 RX ORDER — MONTELUKAST SODIUM 10 MG/1
10 TABLET ORAL DAILY
COMMUNITY
Start: 2024-04-08

## 2024-04-23 RX ORDER — LEVOTHYROXINE SODIUM 50 UG/1
50 TABLET ORAL DAILY
COMMUNITY

## 2024-04-23 RX ORDER — CELECOXIB 200 MG/1
1 CAPSULE ORAL 2 TIMES DAILY
COMMUNITY

## 2024-04-23 RX ORDER — LANSOPRAZOLE 30 MG/1
30 CAPSULE, DELAYED RELEASE ORAL
COMMUNITY

## 2024-04-23 RX ORDER — LOSARTAN POTASSIUM AND HYDROCHLOROTHIAZIDE 12.5; 1 MG/1; MG/1
1 TABLET ORAL DAILY
COMMUNITY

## 2024-04-23 NOTE — TELEPHONE ENCOUNTER
Requested transfer in [facility]. Left message with staff to release patient into our clinic for remote monitoring.

## 2024-04-23 NOTE — PROGRESS NOTES
Electrophysiology Office  Consultation       Reason for visit:   Chief Complaint   Patient presents with    New patient      Pacemaker       HPI   Alva Caputo is a 73 y.o. female who presents for evaluation and management of sinus bradycardia and previously placed pacemaker.  Her original pacemaker was placed in 2009 [facility] and then she had a generator replacement in 2021.  She has recently had an elevated RV pacing threshold and RV impedance.  She denies any cardiac complaints at this time with no episodes of syncope since the pacemaker was placed and no chest pain, shortness of breath, dizziness, or palpitations.        Past Medical History:   Diagnosis Date    Arthritis     Asthma     Disease of thyroid gland     Hypertension     Pacemaker     Sleep apnea     Syncope        History reviewed. No pertinent surgical history.      Social history, family history and allergies were reviewed and updated in EMR.      Current Outpatient Medications   Medication Sig Dispense Refill    celecoxib (celeBREX) 200 mg capsule Take 1 capsule by mouth 2 (two) times a day.      lansoprazole (PREVACID) 30 mg capsule       levothyroxine (SYNTHROID) 50 mcg tablet       losartan-hydrochlorothiazide (HYZAAR) 100-12.5 mg per tablet Take 1 tablet by mouth daily.      montelukast (SINGULAIR) 10 mg tablet        No current facility-administered medications for this visit.          ROS  As per the HPI.  Otherwise comprehensive 10 point review of systems was reviewed and is negative unless mentioned above.      Objective   Vitals:    04/23/24 1341   BP: 130/70     Wt Readings from Last 3 Encounters:   No data found for Wt     There is no height or weight on file to calculate BMI.  Physical Exam  General: No acute distress.  HEENT: Anicteric.  Moist mucous membranes.  Neck: Supple, no JVD.  Lungs: Clear to auscultation bilaterally.  Cardiac: Regular.  No murmurs, rubs or gallops.  Pacemaker site well-healed with  overlying scar.  Abdomen: Soft, nontender.  Extremities: No lower extremity edema.  Skin: Warm, dry.  Neurologic: Grossly intact.  Psychiatric: Behavior is appropriate and cooperative.           Electrocardiogram performed today was personally reviewed by me and showed atrial pacing at 64 bpm with intrinsic ventricular conduction..   CIED (Cardiac Implantable Electronic Device) Interrogation    Indication: Establishment of care  Device: Dual Chamber Pacemaker  Implant Date: Initial implant was May 6, 2009 with generator replacement on March 8, 2021  Model: [employer]i  Battery: 10.1 years remaining  Underlying Rhythm: Sinus rhythm at 62 bpm  Presenting Rhythm: Atrial pacing at 64 bpm with native ventricular conduction    Settings  Mode: AAIR<=>DDDR  Lower Rate Limit: 60 bpm  Upper Tracking Rate: 130 bpm         Atrium RV   Capture/Threshold 1.25 V @ 0.4 msec 4.25 V @ 1.5msec   Intrinsic Amplitude 3.1 mV 7.4 mV   Impedance 741 ohms 1691 ohms     Data/Diagnostics:  Atrial paced: 58%  Ventricular paced: Less than 0.1%    Events:   Mode Switch: None  Ventricular Arrhythmias: None  1 episode of nonsustained atrial tachycardia that was 9 beats in duration     Setting Changes: None    Summary: Dual-chamber pacemaker with normal functioning atrial lead but with an elevated RV threshold and impedance.  RV output is programmed to 6 V at 1.5 ms but she does not pace in the ventricle at all.  I will continue to watch her closely with visits every 3 months to monitor her RV lead.  Given the fact that her only indication is sinus node dysfunction I will continue with conservative management at this time.           Assessment and Plan:    Ms. Caputo is a 73-year-old woman with a history of symptomatic sinus node dysfunction who had a dual-chamber pacemaker placed in 2009 with subsequent generator replacement in 2021.  Her RV pacing threshold is elevated at 4.25 V at 1.5 ms and her RV impedance is elevated at 1691 ohms.  I am  concerned about an impending fracture of her RV lead but given that it does capture consistently at high output I would not recommend any intervention at this time.  She does not paced in the RV and does not have any AV julio conduction disease so we will keep this lead programmed at max output but recognize that she is very unlikely to use it.  I am comfortable with her atrial lead parameters and at this time her only indication is for atrial pacing.  Given the elevated threshold and the inability to check pacing thresholds through remote monitoring I will have her follow-up in the office every 3 months.  I will make no changes to her medical regimen today.    Thank you for allowing me to participate in the care of your patient, please feel free to contact me with any questions or concerns.     Cristy Ocampo MD  4/23/2024     This letter was generated using speech recognition software. Please excuse any typographical errors.

## 2024-04-23 NOTE — TELEPHONE ENCOUNTER
Patient was seen in the office by Dr. Ocampo and would like to transfer her EP care and remote monitoring to [facility].  Patient has a Medtronic dual-chamber pacemaker.  I have updated the patient's cardiology implant section in her chart.  Patient's prior EP physician is as follows:    Otilio Macias MD   [address] [place] [address] [phone] (Work)    Thank you!

## 2024-07-23 ENCOUNTER — TELEPHONE (OUTPATIENT)
Dept: SCHEDULING | Facility: CLINIC | Age: 74
End: 2024-07-23
Payer: COMMERCIAL

## 2024-09-24 ENCOUNTER — OFFICE VISIT (OUTPATIENT)
Dept: CARDIOLOGY | Facility: CLINIC | Age: 74
End: 2024-09-24
Payer: COMMERCIAL

## 2024-09-24 VITALS
SYSTOLIC BLOOD PRESSURE: 124 MMHG | DIASTOLIC BLOOD PRESSURE: 84 MMHG | HEIGHT: 65 IN | OXYGEN SATURATION: 95 % | WEIGHT: 194 LBS | HEART RATE: 65 BPM | BODY MASS INDEX: 32.32 KG/M2

## 2024-09-24 DIAGNOSIS — I10 PRIMARY HYPERTENSION: Primary | ICD-10-CM

## 2024-09-24 PROCEDURE — 93000 ELECTROCARDIOGRAM COMPLETE: CPT | Performed by: INTERNAL MEDICINE

## 2024-09-24 PROCEDURE — 3008F BODY MASS INDEX DOCD: CPT | Performed by: INTERNAL MEDICINE

## 2024-09-24 PROCEDURE — 3074F SYST BP LT 130 MM HG: CPT | Performed by: INTERNAL MEDICINE

## 2024-09-24 PROCEDURE — 3079F DIAST BP 80-89 MM HG: CPT | Performed by: INTERNAL MEDICINE

## 2024-09-24 PROCEDURE — 99215 OFFICE O/P EST HI 40 MIN: CPT | Performed by: INTERNAL MEDICINE

## 2024-09-30 ENCOUNTER — TELEPHONE (OUTPATIENT)
Dept: CARDIOLOGY | Facility: CLINIC | Age: 74
End: 2024-09-30
Payer: COMMERCIAL

## 2024-09-30 NOTE — TELEPHONE ENCOUNTER
[place] Alert     Following Physician: Dr. Ocampo    Medtronic    Pacemaker Report  Monitoring period: 9/24/24-9/27/24    Battery/Lead Status: 10.2 years  Acute ~ 50% decrease of intrinsic P signal amplitude noted on trending  R wave out of range  RV  impedance out of range    Ap:  56.6%  :  0.5%    DOT:  9/27/24; Acute ~ 50% decrease of intrinsic P signal amplitude noted on trending escalated to follow up tab (yellow) on September 30th, 2024, 11:56 am CST.     Addendum:     Patient history of SND.    Next appointment with Dr. Ocampo on 3/11/25.

## 2024-09-30 NOTE — TELEPHONE ENCOUNTER
Reviewed.  Patient with history of SND s/p dual-chamber pacemaker.  Alert received for an acute decrease in P wave amplitude.  Per trends, there was an isolated measurement that was below 1 mV.  The following and current measurement is 2.6 mV, which is stable.  Patient's next office visit with Dr. Ocampo is on 3/11/2025.  Continue to monitor remotely.

## 2024-10-07 NOTE — TELEPHONE ENCOUNTER
"Dr. Cristy Ocampo patient    Seen in the office on 9/24/2024-that OV note hasn't been dictated yet.    Pt currently driving to [place] for the night then on to [facility] tomorrow.    She wanted to let Dr. Ocampo know she had an episode of pain  \"just below her pacemaker\" which happened yesterday and again today. Yesterday's pain happened while she was sitting and today while driving. Lasted seconds and resolved on its own. No other associated symptoms.    She will plug her remote transmission equipment in tonight when she gets to [place] and again tomorrow when she arrives in [place]. She needs direction on sending a remote transmission.    Pt call back # [phone].    Routing to EP provider pool and device team.  "

## 2024-10-08 NOTE — TELEPHONE ENCOUNTER
I spoke with patient and informed her that her manual transmission did not detect any abnormalities that could attribute to her symptoms.  Patient had a recent office visit with Dr. Ocampo on 9/24/2024 and device interrogation was within normal limits and patient had no complaints.  Patient states she experienced 3 episodes of a brief sharp nonradiating pain right below her pacemaker on 9/25, 10/1, and 10/7 not associated with any activity.  Per patient, she had never experienced this before.  Patient denies any signs and symptoms of infection at pacemaker site, her generator change was a few years ago.  Patient was advised to continue to monitor and to notify our office if it becomes persistent.  She has scheduled follow-up with Dr. Ocampo in March.  Patient was appreciative of call.

## 2024-10-10 LAB
ATRIAL RATE: 66
P AXIS: 49
PR INTERVAL: 196
QRS DURATION: 84
QT INTERVAL: 382
QTC CALCULATION(BAZETT): 400
R AXIS: 61
T WAVE AXIS: 56
VENTRICULAR RATE: 66

## 2024-10-10 NOTE — PROGRESS NOTES
Electrophysiology Office  Visit       Reason for visit:   Chief Complaint   Patient presents with    Follow-up      HPI   Alva Caputo is a 74 y.o. female who is following up for management of sinus bradycardia and previously placed pacemaker.  Her original pacemaker was placed in 2009 and [facility] where she had a generator replacement in 2021.  Her RV pacing threshold is elevated as well as her RV impedance, however she does not paced in the ventricle and her only indication for the pacemaker is sinus node dysfunction.  She denies any cardiac complaints at this time with no episodes of syncope, chest pain, shortness of breath, dizziness or palpitations.      Past medical and surgical history, social history, family history and allergies were reviewed and updated in EMR.    Current Outpatient Medications   Medication Sig Dispense Refill    celecoxib (celeBREX) 200 mg capsule Take 1 capsule by mouth 2 (two) times a day. (Patient taking differently: Take 1 capsule by mouth daily.)      lansoprazole (PREVACID) 30 mg capsule Take 30 mg by mouth daily before breakfast.      levothyroxine (SYNTHROID) 50 mcg tablet Take 50 mcg by mouth daily.      losartan-hydrochlorothiazide (HYZAAR) 100-12.5 mg per tablet Take 1 tablet by mouth daily.      montelukast (SINGULAIR) 10 mg tablet Take 10 mg by mouth daily.       No current facility-administered medications for this visit.        ROS  As per the HPI.  Otherwise comprehensive 10 point review of systems was reviewed and is negative unless mentioned above.      Objective   Vitals:    09/24/24 1430   BP: 124/84   Pulse: 65   SpO2: 95%     Wt Readings from Last 3 Encounters:   09/24/24 88 kg (194 lb)     Body mass index is 32.28 kg/m².  Physical Exam  General: No acute distress.  HEENT: Anicteric.  Moist mucous membranes.  Neck: Supple, no JVD.  Lungs: Clear to auscultation bilaterally.  Cardiac: Regular.  No murmurs, rubs or gallops.  Abdomen: Soft,  nontender.  Extremities: No lower extremity edema.  Skin: Warm, dry.  Neurologic: Grossly intact.  Psychiatric: Behavior is appropriate and cooperative.         CIED (Cardiac Implantable Electronic Device) Interrogation     Indication: Sinus node dysfunction  Device: Dual Chamber Pacemaker  Implant Date: Initial implant was May 6, 2009 with generator replacement on March 8, 2021  Model: Medtronic  Battery: 10.2 years remaining  Underlying Rhythm: Sinus rhythm at 47 bpm  Presenting Rhythm: Atrial pacing at 66 bpm with native ventricular conduction     Settings  Mode: AAIR<=>DDDR  Lower Rate Limit: 60 bpm  Upper Tracking Rate: 130 bpm             Atrium RV   Capture/Threshold 1.25 V @ 0.4 msec 4.75 V @ 1.5msec   Intrinsic Amplitude 3.6 mV 3.4 mV   Impedance 684 ohms 1729 ohms      Data/Diagnostics:  Atrial paced: 56.5%  Ventricular paced: Less than 0.1%     Events:   Mode Switch: None  Ventricular Arrhythmias: None  3 episode of nonsustained atrial tachycardia      Setting Changes: None     Summary: Dual-chamber pacemaker with normal functioning atrial lead but with an elevated RV threshold and impedance.  RV output is programmed to 6 V at 1.5 ms but she does not pace in the ventricle at all.  I will continue to watch her closely with visits every 3 months to monitor her RV lead.  Given the fact that her only indication is sinus node dysfunction I will continue with conservative management at this time.         Electrocardiogram performed today was personally reviewed by me and showed atrial paced rhythm at a rate of 66 bpm with intrinsic ventricular conduction.            Assessment and Plan:    Ms. Caputo is a 74-year-old woman with a history of symptomatic sinus node dysfunction who had a dual-chamber pacemaker placed in 2009 with subsequent generator replacement in 2021.  Her RV pacing threshold is elevated at 4.75 V at 1.5 ms and her RV impedance is elevated at 1729 ohms.  I am concerned about an impending  fracture of her RV lead but given that it does capture consistently at high output I would not recommend any intervention at this time.  She does not paced in the RV and does not have any AV julio conduction disease so we will keep this lead programmed at max output but recognize that she is very unlikely to use it.  I am comfortable with her atrial lead parameters and at this time her only indication is for atrial pacing.  Given the elevated threshold and the inability to check pacing thresholds through remote monitoring I will have her follow-up in the office every 3 months.  I will make no changes to her medical regimen today.     Thank you for allowing me to participate in the care of your patient, please feel free to contact me with any questions or concerns.     Cristy Ocampo MD  10/10/2024

## 2025-03-11 ENCOUNTER — OFFICE VISIT (OUTPATIENT)
Dept: CARDIOLOGY | Facility: CLINIC | Age: 75
End: 2025-03-11
Payer: COMMERCIAL

## 2025-03-11 VITALS
HEIGHT: 65 IN | WEIGHT: 200 LBS | SYSTOLIC BLOOD PRESSURE: 100 MMHG | DIASTOLIC BLOOD PRESSURE: 68 MMHG | BODY MASS INDEX: 33.32 KG/M2 | HEART RATE: 64 BPM | OXYGEN SATURATION: 97 %

## 2025-03-11 DIAGNOSIS — I49.5 SINUS NODE DYSFUNCTION (CMS/HCC): ICD-10-CM

## 2025-03-11 DIAGNOSIS — Z95.0 PACEMAKER: ICD-10-CM

## 2025-03-11 DIAGNOSIS — I10 PRIMARY HYPERTENSION: Primary | ICD-10-CM

## 2025-03-11 PROCEDURE — 3078F DIAST BP <80 MM HG: CPT | Performed by: INTERNAL MEDICINE

## 2025-03-11 PROCEDURE — 3008F BODY MASS INDEX DOCD: CPT | Performed by: INTERNAL MEDICINE

## 2025-03-11 PROCEDURE — 93000 ELECTROCARDIOGRAM COMPLETE: CPT | Performed by: INTERNAL MEDICINE

## 2025-03-11 PROCEDURE — 3074F SYST BP LT 130 MM HG: CPT | Performed by: INTERNAL MEDICINE

## 2025-03-11 PROCEDURE — 99215 OFFICE O/P EST HI 40 MIN: CPT | Performed by: INTERNAL MEDICINE

## 2025-03-11 RX ORDER — LOSARTAN POTASSIUM 100 MG/1
100 TABLET ORAL DAILY
COMMUNITY
Start: 2025-02-14

## 2025-03-11 NOTE — PROGRESS NOTES
Electrophysiology Office  Visit       Reason for visit:   Chief Complaint   Patient presents with    Follow-up      HPI   Alva Caputo is a 74 y.o. female who is following up for management of sinus bradycardia and previously placed pacemaker. Her original pacemaker was placed in 2009 at [facility] where she had a generator replacement in 2021. Her RV pacing threshold is rising as well as her RV impedance, however she does not paced in the ventricle and her only indication for the pacemaker is sinus node dysfunction. She denies any cardiac complaints at this time with no episodes of syncope, chest pain, shortness of breath, dizziness or palpitations.       Past medical and surgical history, social history, family history and allergies were reviewed and updated in EMR.    Current Outpatient Medications   Medication Sig Dispense Refill    celecoxib (celeBREX) 200 mg capsule Take 1 capsule by mouth 2 (two) times a day. (Patient taking differently: Take 1 capsule by mouth daily.)      lansoprazole (PREVACID) 30 mg capsule Take 30 mg by mouth daily before breakfast.      levothyroxine (SYNTHROID) 50 mcg tablet Take 50 mcg by mouth daily.      losartan (COZAAR) 100 mg tablet Take 100 mg by mouth daily.      montelukast (SINGULAIR) 10 mg tablet Take 10 mg by mouth daily.      losartan-hydrochlorothiazide (HYZAAR) 100-12.5 mg per tablet Take 1 tablet by mouth daily. (Patient not taking: Reported on 3/11/2025)       No current facility-administered medications for this visit.        ROS  As per the HPI.  Otherwise comprehensive 10 point review of systems was reviewed and is negative unless mentioned above.      Objective   Vitals:    03/11/25 1459   BP: 100/68   Pulse: 64   SpO2: 97%     Wt Readings from Last 3 Encounters:   03/11/25 90.7 kg (200 lb)   09/24/24 88 kg (194 lb)     Body mass index is 33.28 kg/m².  Physical Exam  General: No acute distress. Obese.  HEENT: Anicteric.  Moist mucous  membranes.  Neck: Supple, no JVD.  Lungs: Clear to auscultation bilaterally.  Cardiac: Regular.  No murmurs, rubs or gallops.  Abdomen: Soft, nontender.  Extremities: No lower extremity edema.  Skin: Warm, dry.  Neurologic: Grossly intact.  Psychiatric: Behavior is appropriate and cooperative.         CIED (Cardiac Implantable Electronic Device) Interrogation     Indication: Sinus node dysfunction  Device: Dual Chamber Pacemaker  Implant Date: Initial implant was May 6, 2009 with generator replacement on March 8, 2021  Model: Canines  Battery: 9.6 years remaining  Underlying Rhythm: Sinus rhythm at 47 bpm  Presenting Rhythm: Atrial pacing at 63 bpm with native ventricular conduction     Settings  Mode: AAIR<=>DDDR  Lower Rate Limit: 60 bpm  Upper Tracking Rate: 130 bpm             Atrium RV   Capture/Threshold 1 V @ 0.4 msec 5.5 V @ 1.5msec and 6V@1.2msec   Intrinsic Amplitude 3.1 mV 4.6 mV   Impedance 741 ohms 1862 ohms      Data/Diagnostics:  Atrial paced: 65.1%  Ventricular paced: Less than 0.1%     Events:   Mode Switch: None  Ventricular Arrhythmias: None  3 episode of nonsustained atrial tachycardia      Setting Changes: None     Summary: Dual-chamber pacemaker with normal functioning atrial lead but with an elevated RV threshold and impedance.  RV output is programmed to 6 V at 1.5 ms but she does not pace in the ventricle at all.  I will continue to watch her closely with visits every 3 months to monitor her RV lead.  Given the fact that her only indication is sinus node dysfunction I will continue with conservative management at this time.           Electrocardiogram performed today was personally reviewed by me and showed atrial paced rhythm at a rate of 63 bpm with intrinsic ventricular conduction.         Assessment and Plan:    Ms. Caputo is a 74-year-old woman with a history of symptomatic sinus node dysfunction who had a dual-chamber pacemaker placed in 2009 with subsequent generator replacement  in 2021.  Her RV pacing threshold is elevated at 6V@1.2msec and her RV impedance is elevated at 1862 ohms.  I am concerned about an impending fracture of her RV lead but given that it does capture consistently at high output and she has no indication for the RV lead I would not recommend any intervention at this time.  She does not pace in the RV and does not have any AV julio conduction disease so we will keep this lead programmed at max output but recognize that she is very unlikely to use it.  I am comfortable with her atrial lead parameters and at this time her only indication is for atrial pacing.  I will make no changes to her medical regimen today.  She will return to the office in 6 months.       Thank you for allowing me to participate in the care of your patient, please feel free to contact me with any questions or concerns.     Cristy Ocampo MD  3/11/2025

## 2025-04-04 LAB
ATRIAL RATE: 64
PR INTERVAL: 178
QRS DURATION: 92
QT INTERVAL: 402
QTC CALCULATION(BAZETT): 414
R AXIS: -26
T WAVE AXIS: 23
VENTRICULAR RATE: 64

## 2025-05-08 ENCOUNTER — APPOINTMENT (OUTPATIENT)
Dept: RHEUMATOLOGY | Age: 75
End: 2025-05-08
Attending: INTERNAL MEDICINE

## 2025-07-24 ENCOUNTER — OFFICE VISIT (OUTPATIENT)
Dept: RHEUMATOLOGY | Age: 75
End: 2025-07-24
Attending: INTERNAL MEDICINE

## 2025-07-24 VITALS
SYSTOLIC BLOOD PRESSURE: 119 MMHG | WEIGHT: 177 LBS | TEMPERATURE: 96.7 F | HEIGHT: 67 IN | HEART RATE: 89 BPM | BODY MASS INDEX: 27.78 KG/M2 | DIASTOLIC BLOOD PRESSURE: 63 MMHG

## 2025-07-24 DIAGNOSIS — R42 VERTIGO: Primary | ICD-10-CM

## 2025-07-24 PROCEDURE — 99203 OFFICE O/P NEW LOW 30 MIN: CPT | Performed by: INTERNAL MEDICINE

## 2025-07-25 ENCOUNTER — TELEPHONE (OUTPATIENT)
Dept: ADMINISTRATIVE | Age: 75
End: 2025-07-25

## 2025-08-07 PROBLEM — Z95.0 PRESENCE OF CARDIAC PACEMAKER: Status: ACTIVE | Noted: 2025-08-07

## 2025-08-07 PROBLEM — I49.5 SICK SINUS SYNDROME (HCC): Status: ACTIVE | Noted: 2025-08-07

## 2025-08-07 PROBLEM — M48.062 SPINAL STENOSIS, LUMBAR REGION WITH NEUROGENIC CLAUDICATION: Status: ACTIVE | Noted: 2025-08-07

## 2025-08-07 PROBLEM — R73.01 IMPAIRED FASTING GLUCOSE: Status: ACTIVE | Noted: 2025-08-07

## 2025-08-07 PROBLEM — J30.9 ALLERGIC RHINITIS: Status: ACTIVE | Noted: 2025-08-07

## 2025-08-07 PROBLEM — M19.90 UNSPECIFIED OSTEOARTHRITIS, UNSPECIFIED SITE: Status: ACTIVE | Noted: 2025-08-07

## 2025-08-07 PROBLEM — E03.9 HYPOTHYROID: Status: ACTIVE | Noted: 2025-08-07

## 2025-08-07 PROBLEM — Z98.84 STATUS POST GASTRIC BYPASS FOR OBESITY: Status: ACTIVE | Noted: 2025-08-07

## 2025-08-07 PROBLEM — G47.33 OBSTRUCTIVE SLEEP APNEA: Status: ACTIVE | Noted: 2025-08-07

## 2025-08-07 PROBLEM — J45.909 UNSPECIFIED ASTHMA, UNCOMPLICATED: Status: ACTIVE | Noted: 2025-08-07

## 2025-08-07 PROBLEM — I10 BENIGN HYPERTENSION: Status: ACTIVE | Noted: 2025-08-07

## 2025-08-07 PROBLEM — K21.9 GASTROESOPHAGEAL REFLUX DISEASE: Status: ACTIVE | Noted: 2025-08-07

## 2025-08-18 ENCOUNTER — VBI (OUTPATIENT)
Dept: ADMINISTRATIVE | Facility: OTHER | Age: 75
End: 2025-08-18

## 2025-08-27 PROBLEM — N18.5 CHRONIC KIDNEY DISEASE, STAGE 5 (HCC): Status: ACTIVE | Noted: 2025-08-27
